# Patient Record
Sex: FEMALE | Race: WHITE | NOT HISPANIC OR LATINO | URBAN - NONMETROPOLITAN AREA
[De-identification: names, ages, dates, MRNs, and addresses within clinical notes are randomized per-mention and may not be internally consistent; named-entity substitution may affect disease eponyms.]

---

## 2018-10-03 ENCOUNTER — LAB (OUTPATIENT)
Dept: LAB | Facility: HOSPITAL | Age: 53
End: 2018-10-03
Attending: PEDIATRICS

## 2018-10-03 ENCOUNTER — TRANSCRIBE ORDERS (OUTPATIENT)
Dept: ADMINISTRATIVE | Facility: HOSPITAL | Age: 53
End: 2018-10-03

## 2018-10-03 DIAGNOSIS — R30.0 DYSURIA: Primary | ICD-10-CM

## 2018-10-03 DIAGNOSIS — R30.0 DYSURIA: ICD-10-CM

## 2018-11-24 ENCOUNTER — OFFICE VISIT (OUTPATIENT)
Dept: RETAIL CLINIC | Facility: CLINIC | Age: 53
End: 2018-11-24

## 2018-11-24 VITALS
OXYGEN SATURATION: 98 % | DIASTOLIC BLOOD PRESSURE: 60 MMHG | WEIGHT: 127.8 LBS | HEART RATE: 83 BPM | RESPIRATION RATE: 18 BRPM | BODY MASS INDEX: 20.06 KG/M2 | SYSTOLIC BLOOD PRESSURE: 90 MMHG | TEMPERATURE: 98 F | HEIGHT: 67 IN

## 2018-11-24 DIAGNOSIS — J06.9 ACUTE URI: Primary | ICD-10-CM

## 2018-11-24 PROCEDURE — 99213 OFFICE O/P EST LOW 20 MIN: CPT | Performed by: NURSE PRACTITIONER

## 2018-11-24 RX ORDER — AZITHROMYCIN 250 MG/1
TABLET, FILM COATED ORAL
Qty: 6 TABLET | Refills: 0 | Status: SHIPPED | OUTPATIENT
Start: 2018-11-24 | End: 2022-10-07

## 2018-11-24 RX ORDER — GUAIFENESIN 600 MG/1
1200 TABLET, EXTENDED RELEASE ORAL 2 TIMES DAILY
Qty: 30 TABLET | Refills: 0 | Status: SHIPPED | OUTPATIENT
Start: 2018-11-24 | End: 2022-10-07

## 2018-11-24 RX ORDER — CETIRIZINE HYDROCHLORIDE 10 MG/1
10 TABLET ORAL DAILY
COMMUNITY

## 2018-11-24 NOTE — PATIENT INSTRUCTIONS
"Upper Respiratory Infection, Adult  Most upper respiratory infections (URIs) are a viral infection of the air passages leading to the lungs. A URI affects the nose, throat, and upper air passages. The most common type of URI is nasopharyngitis and is typically referred to as \"the common cold.\"  URIs run their course and usually go away on their own. Most of the time, a URI does not require medical attention, but sometimes a bacterial infection in the upper airways can follow a viral infection. This is called a secondary infection. Sinus and middle ear infections are common types of secondary upper respiratory infections.  Bacterial pneumonia can also complicate a URI. A URI can worsen asthma and chronic obstructive pulmonary disease (COPD). Sometimes, these complications can require emergency medical care and may be life threatening.  What are the causes?  Almost all URIs are caused by viruses. A virus is a type of germ and can spread from one person to another.  What increases the risk?  You may be at risk for a URI if:  · You smoke.  · You have chronic heart or lung disease.  · You have a weakened defense (immune) system.  · You are very young or very old.  · You have nasal allergies or asthma.  · You work in crowded or poorly ventilated areas.  · You work in health care facilities or schools.    What are the signs or symptoms?  Symptoms typically develop 2-3 days after you come in contact with a cold virus. Most viral URIs last 7-10 days. However, viral URIs from the influenza virus (flu virus) can last 14-18 days and are typically more severe. Symptoms may include:  · Runny or stuffy (congested) nose.  · Sneezing.  · Cough.  · Sore throat.  · Headache.  · Fatigue.  · Fever.  · Loss of appetite.  · Pain in your forehead, behind your eyes, and over your cheekbones (sinus pain).  · Muscle aches.    How is this diagnosed?  Your health care provider may diagnose a URI by:  · Physical exam.  · Tests to check that your " symptoms are not due to another condition such as:  ? Strep throat.  ? Sinusitis.  ? Pneumonia.  ? Asthma.    How is this treated?  A URI goes away on its own with time. It cannot be cured with medicines, but medicines may be prescribed or recommended to relieve symptoms. Medicines may help:  · Reduce your fever.  · Reduce your cough.  · Relieve nasal congestion.    Follow these instructions at home:  · Take medicines only as directed by your health care provider.  · Gargle warm saltwater or take cough drops to comfort your throat as directed by your health care provider.  · Use a warm mist humidifier or inhale steam from a shower to increase air moisture. This may make it easier to breathe.  · Drink enough fluid to keep your urine clear or pale yellow.  · Eat soups and other clear broths and maintain good nutrition.  · Rest as needed.  · Return to work when your temperature has returned to normal or as your health care provider advises. You may need to stay home longer to avoid infecting others. You can also use a face mask and careful hand washing to prevent spread of the virus.  · Increase the usage of your inhaler if you have asthma.  · Do not use any tobacco products, including cigarettes, chewing tobacco, or electronic cigarettes. If you need help quitting, ask your health care provider.  How is this prevented?  The best way to protect yourself from getting a cold is to practice good hygiene.  · Avoid oral or hand contact with people with cold symptoms.  · Wash your hands often if contact occurs.    There is no clear evidence that vitamin C, vitamin E, echinacea, or exercise reduces the chance of developing a cold. However, it is always recommended to get plenty of rest, exercise, and practice good nutrition.  Contact a health care provider if:  · You are getting worse rather than better.  · Your symptoms are not controlled by medicine.  · You have chills.  · You have worsening shortness of breath.  · You have  brown or red mucus.  · You have yellow or brown nasal discharge.  · You have pain in your face, especially when you bend forward.  · You have a fever.  · You have swollen neck glands.  · You have pain while swallowing.  · You have white areas in the back of your throat.  Get help right away if:  · You have severe or persistent:  ? Headache.  ? Ear pain.  ? Sinus pain.  ? Chest pain.  · You have chronic lung disease and any of the following:  ? Wheezing.  ? Prolonged cough.  ? Coughing up blood.  ? A change in your usual mucus.  · You have a stiff neck.  · You have changes in your:  ? Vision.  ? Hearing.  ? Thinking.  ? Mood.  This information is not intended to replace advice given to you by your health care provider. Make sure you discuss any questions you have with your health care provider.  Document Released: 06/13/2002 Document Revised: 08/20/2017 Document Reviewed: 03/25/2015  [employer] Interactive Patient Education © 2018 [employer]    Due to the duration of your symptoms and the fact they are worsening an antibiotic has been prescribed for you that needs to be taken as directed for the full length of treatment. It is recommended that you take a probiotic when taking an antibiotic. Probiotics are found over the counter in pill form and in some brands of yogurt.      Increase fluid intake. Take Mucinex as prescribed to thin drainage.     If symptoms persist or worsen please see your primary care provider or return for recheck.

## 2018-11-25 NOTE — PROGRESS NOTES
"  Chief Complaint   Patient presents with   • Sore Throat   • Earache     Subjective   Michelle Schulz is a 53 y.o. female who presents to the clinic today with complaints of sore throat, earache, post nasal drainage, cough.   URI    This is a new problem. Episode onset: \"over a week ago\" The problem has been gradually worsening. Maximum temperature: not sure, hasn't been able to check. Associated symptoms include congestion, coughing (mostly dry), ear pain (left ear \"just feels weird\"), headaches (frontal ), sneezing and a sore throat (worse the last couple of days). Pertinent negatives include no abdominal pain, chest pain, diarrhea, nausea, rhinorrhea, vomiting or wheezing. Treatments tried: Excedrin migraine, Zyrtec, Nyquil. The treatment provided mild relief.     Current Outpatient Medications:   •  cetirizine (zyrTEC) 10 MG tablet, Take 10 mg by mouth Daily., Disp: , Rfl:   •  DM-Phenylephrine-Acetaminophen 10-5-325 MG capsule, Take  by mouth., Disp: , Rfl:     Allergies:  Penicillins and Tetracyclines & related    Past Medical History:   Diagnosis Date   • Allergic    • Migraines      Past Surgical History:   Procedure Laterality Date   •  SECTION       Family History   Problem Relation Age of Onset   • Cancer Mother    • Cancer Father      Social History     Tobacco Use   • Smoking status: Never Smoker   Substance Use Topics   • Alcohol use: No     Frequency: Never   • Drug use: Defer       Review of Systems  Review of Systems   Constitutional: Negative for chills, fatigue and fever.   HENT: Positive for congestion, ear pain (left ear \"just feels weird\"), sneezing and sore throat (worse the last couple of days). Negative for rhinorrhea.    Respiratory: Positive for cough (mostly dry). Negative for shortness of breath and wheezing.    Cardiovascular: Negative for chest pain.   Gastrointestinal: Negative for abdominal pain, diarrhea, nausea and vomiting.   Neurological: Positive for headaches (frontal ). " "      Objective   BP 90/60   Pulse 83   Temp 98 °F (36.7 °C) (Oral)   Resp 18   Ht 170.2 cm (67\")   Wt 58 kg (127 lb 12.8 oz)   SpO2 98%   BMI 20.02 kg/m²       Physical Exam   Constitutional: She is oriented to person, place, and time. She appears well-developed and well-nourished. She is cooperative. No distress.   HENT:   Head: Normocephalic and atraumatic.   Right Ear: Tympanic membrane, external ear and ear canal normal.   Left Ear: Tympanic membrane, external ear and ear canal normal.   Nose: Nose normal. No sinus tenderness.   Mouth/Throat: Uvula is midline and mucous membranes are normal. Posterior oropharyngeal erythema (mild erythema, post nasal drainage) present. Tonsils are 1+ on the right. Tonsils are 1+ on the left. No tonsillar exudate.   Eyes: Conjunctivae, EOM and lids are normal. Pupils are equal, round, and reactive to light.   Neck: Trachea normal and normal range of motion. Neck supple.   Cardiovascular: Normal rate, regular rhythm, S1 normal, S2 normal and normal heart sounds.   Pulmonary/Chest: Effort normal and breath sounds normal. No respiratory distress. She has no wheezes. She has no rhonchi. She has no rales.   Lymphadenopathy:     She has no cervical adenopathy.   Neurological: She is alert and oriented to person, place, and time. Coordination and gait normal.   Skin: Skin is warm, dry and intact.   Psychiatric: She has a normal mood and affect. Her speech is normal and behavior is normal.   Vitals reviewed.      Assessment/Plan     Michelle was seen today for sore throat and earache.    Diagnoses and all orders for this visit:    Acute URI    Other orders  -     azithromycin (ZITHROMAX Z-ZANE) 250 MG tablet; Take 2 tablets the first day, then 1 tablet daily for 4 days.  -     guaiFENesin (MUCINEX) 600 MG 12 hr tablet; Take 2 tablets by mouth 2 (Two) Times a Day.      Due to the duration of your symptoms and the fact they are worsening an antibiotic has been prescribed for you that " needs to be taken as directed for the full length of treatment. It is recommended that you take a probiotic when taking an antibiotic. Probiotics are found over the counter in pill form and in some brands of yogurt.      Increase fluid intake. Take Mucinex as prescribed to thin drainage.     If symptoms persist or worsen please see your primary care provider or return for recheck.

## 2020-09-26 ENCOUNTER — NURSE TRIAGE (OUTPATIENT)
Dept: CALL CENTER | Facility: HOSPITAL | Age: 55
End: 2020-09-26

## 2020-09-26 PROCEDURE — 87635 SARS-COV-2 COVID-19 AMP PRB: CPT | Performed by: NURSE PRACTITIONER

## 2020-09-27 NOTE — TELEPHONE ENCOUNTER
"Seen at the [facility] today was tested for covid, did a rapid test and she never heard, pt coughing almost non stop. Stated they told her lungs had sounded fine, they thought it was all drainage. Highest temp has been 100.has flonase and tessalon for cough    Reason for Disposition  • [1] Continuous (nonstop) coughing interferes with work or school AND [2] no improvement using cough treatment per Care Advice    Additional Information  • Negative: Severe difficulty breathing (e.g., struggling for each breath, speaks in single words)  • Negative: Bluish (or gray) lips or face now  • Negative: [1] Difficulty breathing AND [2] exposure to flames, smoke, or fumes  • Negative: [1] Stridor AND [2] difficulty breathing  • Negative: Sounds like a life-threatening emergency to the triager  • Negative: [1] Previous asthma attacks AND [2] this feels like asthma attack  • Negative: Dry (non-productive) cough (i.e., no sputum or minimal clear sputum)  • Negative: Chest pain  (Exception: MILD central chest pain, present only when coughing)  • Negative: Difficulty breathing  • Negative: Patient sounds very sick or weak to the triager  • Negative: [1] Coughed up blood AND [2] > 1 tablespoon (15 ml) (Exception: blood-tinged sputum)  • Negative: Fever > 103 F (39.4 C)  • Negative: [1] Fever > 101 F (38.3 C) AND [2] age > 60  • Negative: [1] Fever > 100.0 F (37.8 C) AND [2] bedridden (e.g., nursing home patient, CVA, chronic illness, recovering from surgery)  • Negative: [1] Fever > 100.0 F (37.8 C) AND [2] diabetes mellitus or weak immune system (e.g., HIV positive, cancer chemo, splenectomy, organ transplant, chronic steroids)  • Negative: Wheezing is present  • Negative: SEVERE coughing spells (e.g., whooping sound after coughing, vomiting after coughing)  • Negative: Coughing up anne marie-colored (reddish-brown) sputum  • Negative: Fever present > 3 days (72 hours)    Answer Assessment - Initial Assessment Questions  1. ONSET: \"When did the " "cough begin?\"        acouple days ago  2. SEVERITY: \"How bad is the cough today?\"      worse  3. RESPIRATORY DISTRESS: \"Describe your breathing.\"       congested  4. FEVER: \"Do you have a fever?\" If so, ask: \"What is your temperature, how was it measured, and when did it start?\"      100  5. SPUTUM: \"Describe the color of your sputum\" (clear, white, yellow, green)      n/a  6. HEMOPTYSIS: \"Are you coughing up any blood?\" If so ask: \"How much?\" (flecks, streaks, tablespoons, etc.)      no  7. CARDIAC HISTORY: \"Do you have any history of heart disease?\" (e.g., heart attack, congestive heart failure)       no  8. LUNG HISTORY: \"Do you have any history of lung disease?\"  (e.g., pulmonary embolus, asthma, emphysema)      n/a  9. PE RISK FACTORS: \"Do you have a history of blood clots?\" (or: recent major surgery, recent prolonged travel, bedridden)      unknown  10. OTHER SYMPTOMS: \"Do you have any other symptoms?\" (e.g., runny nose, wheezing, chest pain)        Very runny nose  11. PREGNANCY: \"Is there any chance you are pregnant?\" \"When was your last menstrual period?\"        no  12. TRAVEL: \"Have you traveled out of the country in the last month?\" (e.g., travel history, exposures)        no    Protocols used: COUGH - ACUTE PRODUCTIVE-ADULT-AH      "

## 2022-10-07 ENCOUNTER — HOSPITAL ENCOUNTER (OUTPATIENT)
Dept: GENERAL RADIOLOGY | Facility: HOSPITAL | Age: 57
Discharge: HOME | End: 2022-10-07

## 2022-10-07 PROCEDURE — 73610 X-RAY EXAM OF ANKLE: CPT

## 2022-10-07 PROCEDURE — 73562 X-RAY EXAM OF KNEE 3: CPT

## 2024-06-20 ENCOUNTER — TRANSCRIBE ORDERS (OUTPATIENT)
Dept: PHYSICAL THERAPY | Facility: CLINIC | Age: 59
End: 2024-06-20
Payer: COMMERCIAL

## 2024-06-20 DIAGNOSIS — M54.50 LOW BACK PAIN, UNSPECIFIED BACK PAIN LATERALITY, UNSPECIFIED CHRONICITY, UNSPECIFIED WHETHER SCIATICA PRESENT: Primary | ICD-10-CM

## 2024-07-29 NOTE — PROGRESS NOTES
[facility] - PODIATRY    Today's Date: 08/06/2024     Patient Name: Michelle Schulz  MRN: [id number]  CSN: [id number]  PCP: Leonardo Boston Jr., MD  Referring Provider: Leonardo Boston Jr., *    SUBJECTIVE     Chief Complaint   Patient presents with    Follow-up     Leonardo Boston Jr., MD 07/09/024 PARESTHESIA BOTTOM RIGHT FOOT- TOES AND PAD- NO IMAGING- NO PRIOR SX- pt states right foot is numb and tingly all the time and feels like sock is bunched up under pad. Left foot occ. Has known back issues but thinks that is the problem but her dr says that is not the case. Also has poss nail fungus - pt pain 3/10 at worst      HPI: Michelle Schulz, a 58 y.o.female, comes to clinic as a(n) new patient complaining of foot pain and complaining of toenail/callus issues. Patient has h/o migraines, GERD, hiatal hernia . Patient is non-diabetic.  Pain to her right foot that started about 3 months ago.  Patient also relays a numb and tingling-like feeling to the ball of her right foot.  Relates that she began to have issues after she injured her back laying rocks down in her yard back in May.  Notes she did go to her PCP who then referred her here to podiatry.  Patient attributes the pain to her right foot to the injury to her back.  Notes she did have an x-ray obtained that did not show any issues with the bone structure however she feels as if she may be having sciatic like pain on her right side.  Also relays that her toenails are thickened, discolored, and crumbly.  Notes they have been like this for 20 something years now, however is curious about the different treatment options for this condition moving forward.  Admits pain at 3/10 level and described as pressure and sharp. Denies previous treatment. Denies any constitutional symptoms. No other pedal complaints at this time. Been going to the chiropractor- hurt her back laying rocks back in May.     Past Medical History:   Diagnosis Date    Allergic      Hiatal hernia     Migraines      Past Surgical History:   Procedure Laterality Date     SECTION      ESOPHAGEAL DILATATION       Family History   Problem Relation Age of Onset    Cancer Mother     Cancer Father      Social History     Socioeconomic History    Marital status:    Tobacco Use    Smoking status: Never    Smokeless tobacco: Never   Vaping Use    Vaping status: Never Used   Substance and Sexual Activity    Alcohol use: No    Drug use: Never    Sexual activity: Yes     Partners: Male     Allergies   Allergen Reactions    Penicillins Hives    Shellfish-Derived Products Swelling    Tetracyclines & Related Hives    Sumatriptan Nausea And Vomiting     Current Outpatient Medications   Medication Sig Dispense Refill    acetaminophen (TYLENOL) 650 MG 8 hr tablet Take 1 tablet by mouth As Needed.      ELDERBERRY PO Take  by mouth.      ubrogepant 100 MG tablet PLEASE SEE ATTACHED FOR DETAILED DIRECTIONS      albuterol sulfate  (90 Base) MCG/ACT inhaler Inhale 2 puffs Every 4 (Four) Hours As Needed for Wheezing or Shortness of Air (Rinse mouth after use). 6.7 g 0    cetirizine (zyrTEC) 10 MG tablet Take 1 tablet by mouth Daily.      ketorolac (TORADOL) 10 MG tablet Take 1 tablet by mouth Every 6 (Six) Hours As Needed for Moderate Pain. 20 tablet 0    methylPREDNISolone (MEDROL) 4 MG dose pack Take as directed 21 tablet 0     No current facility-administered medications for this visit.     Review of Systems   Constitutional:  Negative for chills and fever.   HENT:  Negative for congestion.    Respiratory:  Negative for shortness of breath.    Cardiovascular:  Negative for chest pain and leg swelling.   Gastrointestinal:  Negative for constipation, diarrhea, nausea and vomiting.   Musculoskeletal:  Positive for arthralgias and back pain.        Right foot pain; lower back pain   Skin:  Negative for wound.        Thickened, irregular, discolored toenails   Neurological:  Negative for numbness.    Hematological:  Does not bruise/bleed easily.   Psychiatric/Behavioral:  Negative for agitation, behavioral problems and confusion.        OBJECTIVE     Vitals:    08/06/24 0845   BP: 124/78   Pulse: 61   SpO2: 98%       PHYSICAL EXAM  GEN:   Accompanied by granddaughter.     Foot/Ankle Exam    GENERAL  Appearance:  appears stated age  Orientation:  AAOx3  Affect:  appropriate  Gait:  unimpaired  Assistance:  independent  Right shoe gear: casual shoe  Left shoe gear: casual shoe    VASCULAR     Right Foot Vascularity   Dorsalis pedis:  2+  Posterior tibial:  2+  Skin temperature:  warm  Edema grading:  None  CFT:  3  Pedal hair growth:  Absent  Varicosities:  none     Left Foot Vascularity   Dorsalis pedis:  2+  Posterior tibial:  2+  Skin temperature:  warm  Edema grading:  None  CFT:  3  Pedal hair growth:  Absent  Varicosities:  none     NEUROLOGIC     Right Foot Neurologic   Light touch sensation: diminished  Vibratory sensation: diminished  Hot/Cold sensation: diminished  Protective Sensation using [place]icky Monofilament:   Sites intact: 7  Sites tested: 10     Left Foot Neurologic   Normal sensation    Light touch sensation: normal  Vibratory sensation: normal  Hot/Cold sensation:  normal  Protective Sensation using [place] Monofilament:   Sites intact: 10  Sites tested: 10    MUSCULOSKELETAL     Right Foot Musculoskeletal   Ecchymosis:  none  Tenderness:  neuroma tenderness   (Noted to second IS)  Arch:  Normal     Left Foot Musculoskeletal   Ecchymosis:  none  Tenderness:  none  Arch:  Normal    MUSCLE STRENGTH     Right Foot Muscle Strength   Foot dorsiflexion:  5  Foot plantar flexion:  5  Foot inversion:  5  Foot eversion:  5     Left Foot Muscle Strength   Foot dorsiflexion:  5  Foot plantar flexion:  5  Foot inversion:  5  Foot eversion:  5    RANGE OF MOTION     Right Foot Range of Motion   Foot and ankle ROM within normal limits       Left Foot Range of Motion   Foot and ankle ROM  within normal limits      DERMATOLOGIC      Right Foot Dermatologic   Skin  Right foot skin is intact.   Nails  1.  Positive for onychomycosis, abnormal thickness, subungual debris and dystrophic nail.  2.  Positive for onychomycosis, abnormal thickness, subungual debris and dystrophic nail.  3.  Positive for onychomycosis, abnormal thickness, subungual debris and dystrophic nail.  4.  Positive for onychomycosis, abnormal thickness, subungual debris and dystrophic nail.  5.  Positive for onychomycosis, abnormal thickness, subungual debris and dystrophic nail.     Left Foot Dermatologic   Skin  Left foot skin is intact.   Nails  1.  Positive for onychomycosis, abnormal thickness, subungual debris and dystrophic nail.  2.  Positive for onychomycosis, abnormal thickness, subungual debris and dystrophic nail.  3.  Positive for onychomycosis, abnormal thickness, subungual debris and dystrophic nail.  4.  Positive for onychomycosis, abnormally thick, subungual debris and dystrophic nail.  5.  Positive for onychomycosis, abnormally thick, subungual debris and dystrophic nail.    Image:       RADIOLOGY/NUCLEAR:  No results found.    LABORATORY/CULTURE RESULTS:      PATHOLOGY RESULTS:       ASSESSMENT/PLAN     Diagnoses and all orders for this visit:    1. Right foot pain (Primary)    2. Plantar neuroma of right foot    3. Onychomycosis    4. Neuropathy    5. Acute right-sided low back pain, unspecified whether sciatica present  -     MRI Lumbar Spine Without Contrast; Future  -     Cancel: Ambulatory Referral to Neurosurgery  -     Ambulatory Referral to Neurosurgery    Other orders  -     methylPREDNISolone (MEDROL) 4 MG dose pack; Take as directed  Dispense: 21 tablet; Refill: 0      Comprehensive lower extremity examination and evaluation was performed.  Discussed findings and treatment plan including risks, benefits, and treatment options with patient in detail. Patient agreed with treatment plan.  Patient may maintain  nails and calluses at home utilizing emery board or pumice stone between visits as needed  Findings consistent with plantar neuroma to second IS of right foot.  Discussed several different treatment options for this condition patient including conservative measures with oral and/or topical NSAIDs, wearing supportive thick cushioned shoes, icing,resting, and use of metatarsal pad to displace pressure.  So discussed possibility of trying a steroid injection into site of pain.  Patient prefers to remain conservative at this point in time.   Rx placed for steroid pack today for pain and inflammation to right foot.  Metatarsal pad dispensed at today's visit.  Patient continues to relay that she believes her numbness is coming from her lower back- placed order for MRI since xray has already been performed. Discussed with patient insurance may not cover this imaging and that scheduling would be in contact with her if that was the case.   Referral to Neurosurgery placed today for further evaluation of previous back injury.   Patient to follow-up in 6 weeks for recheck.    An After Visit Summary was printed and given to the patient at discharge, including (if requested) any available informative/educational handouts regarding diagnosis, treatment, or medications. All questions were answered to patient/family satisfaction. Should symptoms fail to improve or worsen they agree to call or return to clinic or to go to the Emergency Department. Discussed the importance of following up with any needed screening tests/labs/specialist appointments and any requested follow-up recommended by me today. Importance of maintaining follow-up discussed and patient accepts that missed appointments can delay diagnosis and potentially lead to worsening of conditions.  Return in about 6 weeks (around 9/17/2024) for Follow-up with APRN, Follow-up in [facility] or sooner if acute issues arise.      This document has been electronically  signed by Alina Mahoney, SEAN on August 6, 2024 13:56 CDT

## 2024-08-05 ENCOUNTER — TELEPHONE (OUTPATIENT)
Age: 59
End: 2024-08-05
Payer: COMMERCIAL

## 2024-08-05 NOTE — TELEPHONE ENCOUNTER
Hub to relay   Spoke with patient regarding appt on 08/06/2024. Patient confirmed date and time off appt.

## 2024-08-06 ENCOUNTER — OFFICE VISIT (OUTPATIENT)
Age: 59
End: 2024-08-06
Payer: COMMERCIAL

## 2024-08-06 ENCOUNTER — PATIENT ROUNDING (BHMG ONLY) (OUTPATIENT)
Age: 59
End: 2024-08-06

## 2024-08-06 VITALS
BODY MASS INDEX: 22.76 KG/M2 | HEIGHT: 67 IN | SYSTOLIC BLOOD PRESSURE: 124 MMHG | OXYGEN SATURATION: 98 % | DIASTOLIC BLOOD PRESSURE: 78 MMHG | HEART RATE: 61 BPM | WEIGHT: 145 LBS

## 2024-08-06 DIAGNOSIS — G62.9 NEUROPATHY: ICD-10-CM

## 2024-08-06 DIAGNOSIS — G57.61 PLANTAR NEUROMA OF RIGHT FOOT: ICD-10-CM

## 2024-08-06 DIAGNOSIS — M54.50 ACUTE RIGHT-SIDED LOW BACK PAIN, UNSPECIFIED WHETHER SCIATICA PRESENT: ICD-10-CM

## 2024-08-06 DIAGNOSIS — M79.671 RIGHT FOOT PAIN: Primary | ICD-10-CM

## 2024-08-06 DIAGNOSIS — B35.1 ONYCHOMYCOSIS: ICD-10-CM

## 2024-08-06 PROBLEM — K21.9 GERD (GASTROESOPHAGEAL REFLUX DISEASE): Chronic | Status: ACTIVE | Noted: 2024-08-06

## 2024-08-06 PROCEDURE — 99214 OFFICE O/P EST MOD 30 MIN: CPT

## 2024-08-06 RX ORDER — METHYLPREDNISOLONE 4 MG/1
TABLET ORAL
Qty: 21 TABLET | Refills: 0 | Status: SHIPPED | OUTPATIENT
Start: 2024-08-06

## 2024-08-06 NOTE — PROGRESS NOTES
August 6, 2024    Hello, may I speak with Michelle Schulz?    My name is JUNE      I am  with [facility]  [address]  [facility] [address] [phone].    Before we get started may I verify your date of birth? 1965    I am calling to officially welcome you to our practice and ask about your recent visit. Is this a good time to talk? yes    Tell me about your visit with us. What things went well?  VISIT WENT WELL       We're always looking for ways to make our patients' experiences even better. Do you have recommendations on ways we may improve?  no    Overall were you satisfied with your first visit to our practice? yes       I appreciate you taking the time to speak with me today. Is there anything else I can do for you? no      Thank you, and have a great day.

## 2024-09-25 ENCOUNTER — TELEPHONE (OUTPATIENT)
Dept: OBSTETRICS AND GYNECOLOGY | Age: 59
End: 2024-09-25
Payer: COMMERCIAL

## 2024-09-26 ENCOUNTER — OFFICE VISIT (OUTPATIENT)
Dept: OBSTETRICS AND GYNECOLOGY | Age: 59
End: 2024-09-26
Payer: COMMERCIAL

## 2024-09-26 VITALS
WEIGHT: 146 LBS | BODY MASS INDEX: 22.91 KG/M2 | SYSTOLIC BLOOD PRESSURE: 108 MMHG | DIASTOLIC BLOOD PRESSURE: 76 MMHG | HEIGHT: 67 IN

## 2024-09-26 DIAGNOSIS — Z01.419 WELL WOMAN EXAM WITH ROUTINE GYNECOLOGICAL EXAM: Primary | ICD-10-CM

## 2024-09-26 DIAGNOSIS — Z12.31 ENCOUNTER FOR SCREENING MAMMOGRAM FOR BREAST CANCER: ICD-10-CM

## 2024-09-26 DIAGNOSIS — Z12.4 ENCOUNTER FOR SCREENING FOR CERVICAL CANCER: ICD-10-CM

## 2024-09-26 DIAGNOSIS — N89.8 VAGINAL DRYNESS: ICD-10-CM

## 2024-09-26 PROCEDURE — G0123 SCREEN CERV/VAG THIN LAYER: HCPCS | Performed by: NURSE PRACTITIONER

## 2024-09-26 PROCEDURE — 87624 HPV HI-RISK TYP POOLED RSLT: CPT | Performed by: NURSE PRACTITIONER

## 2024-09-26 RX ORDER — ESTRADIOL 0.1 MG/G
CREAM VAGINAL
Qty: 42.5 G | Refills: 0 | Status: SHIPPED | OUTPATIENT
Start: 2024-09-26

## 2024-09-27 LAB
GEN CATEG CVX/VAG CYTO-IMP: NORMAL
HPV I/H RISK 4 DNA CVX QL PROBE+SIG AMP: NOT DETECTED
LAB AP CASE REPORT: NORMAL
LAB AP GYN ADDITIONAL INFORMATION: NORMAL
LAB AP GYN OTHER FINDINGS: NORMAL
Lab: NORMAL
PATH INTERP SPEC-IMP: NORMAL
STAT OF ADQ CVX/VAG CYTO-IMP: NORMAL

## 2025-02-04 ENCOUNTER — TRANSCRIBE ORDERS (OUTPATIENT)
Dept: ADMINISTRATIVE | Facility: HOSPITAL | Age: 60
End: 2025-02-04
Payer: COMMERCIAL

## 2025-02-04 DIAGNOSIS — Z12.31 ENCOUNTER FOR SCREENING MAMMOGRAM FOR MALIGNANT NEOPLASM OF BREAST: Primary | ICD-10-CM

## 2025-02-09 LAB
NCCN CRITERIA FLAG: NORMAL
TYRER CUZICK SCORE: 18.5

## 2025-03-19 ENCOUNTER — HOSPITAL ENCOUNTER (OUTPATIENT)
Dept: MAMMOGRAPHY | Facility: HOSPITAL | Age: 60
Discharge: HOME | End: 2025-03-19
Admitting: NURSE PRACTITIONER
Payer: COMMERCIAL

## 2025-03-19 DIAGNOSIS — Z12.31 ENCOUNTER FOR SCREENING MAMMOGRAM FOR BREAST CANCER: ICD-10-CM

## 2025-03-19 PROCEDURE — 77063 BREAST TOMOSYNTHESIS BI: CPT

## 2025-03-19 PROCEDURE — 77067 SCR MAMMO BI INCL CAD: CPT

## 2025-03-20 ENCOUNTER — TELEPHONE (OUTPATIENT)
Dept: OBSTETRICS AND GYNECOLOGY | Age: 60
End: 2025-03-20
Payer: COMMERCIAL

## 2025-03-20 NOTE — TELEPHONE ENCOUNTER
Pt called to ask about message to cancel appointment. Pt states that provider told her she did not need to come anymore for visits and that pt would like to just cancel the appointment in October. Pt informed that this appointment was her well women's exam. Pt reports she is postmenopausal and does not need to be seen. Voices understanding.

## 2025-06-09 ENCOUNTER — TELEPHONE (OUTPATIENT)
Dept: SURGERY | Facility: CLINIC | Age: 60
End: 2025-06-09
Payer: COMMERCIAL

## 2025-06-09 NOTE — TELEPHONE ENCOUNTER
Attempted to contact pt regarding her cancelled appointment with us. I left pt a detailed voicemail with our office phone number to call back to get rescheduled with us.     ECC  6/9/2025

## 2025-06-30 ENCOUNTER — OFFICE VISIT (OUTPATIENT)
Dept: FAMILY MEDICINE CLINIC | Facility: CLINIC | Age: 60
End: 2025-06-30
Payer: COMMERCIAL

## 2025-06-30 VITALS
OXYGEN SATURATION: 96 % | HEART RATE: 95 BPM | TEMPERATURE: 98.8 F | WEIGHT: 148.8 LBS | SYSTOLIC BLOOD PRESSURE: 120 MMHG | BODY MASS INDEX: 23.35 KG/M2 | DIASTOLIC BLOOD PRESSURE: 88 MMHG | HEIGHT: 67 IN

## 2025-06-30 DIAGNOSIS — R06.2 WHEEZING: ICD-10-CM

## 2025-06-30 DIAGNOSIS — K44.9 HIATAL HERNIA: ICD-10-CM

## 2025-06-30 DIAGNOSIS — R09.81 NASAL CONGESTION: ICD-10-CM

## 2025-06-30 DIAGNOSIS — J04.0 LARYNGITIS: Primary | ICD-10-CM

## 2025-06-30 PROCEDURE — 99213 OFFICE O/P EST LOW 20 MIN: CPT | Performed by: FAMILY MEDICINE

## 2025-06-30 RX ORDER — ALBUTEROL SULFATE 90 UG/1
2 INHALANT RESPIRATORY (INHALATION) EVERY 4 HOURS PRN
Qty: 18 G | Refills: 11 | Status: SHIPPED | OUTPATIENT
Start: 2025-06-30

## 2025-06-30 RX ORDER — CLINDAMYCIN HYDROCHLORIDE 300 MG/1
1 CAPSULE ORAL 3 TIMES DAILY
COMMUNITY
Start: 2025-06-17

## 2025-06-30 RX ORDER — FLUTICASONE PROPIONATE 50 MCG
2 SPRAY, SUSPENSION (ML) NASAL DAILY
COMMUNITY
Start: 2025-06-17

## 2025-06-30 RX ORDER — PREDNISONE 20 MG/1
TABLET ORAL
Qty: 12 TABLET | Refills: 0 | Status: SHIPPED | OUTPATIENT
Start: 2025-06-30

## 2025-06-30 RX ORDER — OMEPRAZOLE 40 MG/1
40 CAPSULE, DELAYED RELEASE ORAL DAILY
Qty: 30 CAPSULE | Refills: 1 | Status: SHIPPED | OUTPATIENT
Start: 2025-06-30

## 2025-06-30 RX ORDER — HYDROXYZINE HYDROCHLORIDE 10 MG/5ML
4 SYRUP ORAL EVERY 6 HOURS PRN
COMMUNITY

## 2025-06-30 NOTE — PROGRESS NOTES
CC:   Chief Complaint   Patient presents with    Establish Care     Patient presents to clinic to establish care    Hoarse    Ear Fullness    Dizziness       History:  Michelle Schulz is a 59 y.o. female who presents today for follow-up for evaluation of the above:    History of Present Illness  Felt sick since 6/10/25  Started with runny nose, cough, congestion  Seen by [place] [facility] that week and given IM steroid and abx, had bilateral ear infection  Had follow up and was given steroid pills, cough pills, abx, nose spray that has improved but she still feels like her ears are still underwater, cough with wheezing, hoarse voice, sometimes the room spins  Takes over-the-counter omeprazole for hiatal hernia      Ms. Schulz  reports that she has never smoked. She has never used smokeless tobacco. She reports that she does not drink alcohol and does not use drugs.      Current Outpatient Medications:     acetaminophen (TYLENOL) 650 MG 8 hr tablet, Take 1 tablet by mouth As Needed., Disp: , Rfl:     chlorpheniramine (CHLOR-TRIMETON) 4 MG tablet, Take 1 tablet by mouth Every 6 (Six) Hours As Needed for Allergies., Disp: , Rfl:     clindamycin (CLEOCIN) 300 MG capsule, Take 1 capsule by mouth 3 times a day., Disp: , Rfl:     ELDERBERRY PO, Take  by mouth., Disp: , Rfl:     fluticasone (FLONASE) 50 MCG/ACT nasal spray, Administer 2 sprays into the nostril(s) as directed by provider Daily., Disp: , Rfl:     ubrogepant 100 MG tablet, PLEASE SEE ATTACHED FOR DETAILED DIRECTIONS (Patient taking differently: 1 tablet As Needed.), Disp: , Rfl:     albuterol sulfate  (90 Base) MCG/ACT inhaler, Inhale 2 puffs Every 4 (Four) Hours As Needed for Wheezing or Shortness of Air., Disp: 18 g, Rfl: 11    estradiol (ESTRACE) 0.1 MG/GM vaginal cream, Use 1 gram vaginally nightly x 2 weeks then 2-3 times weekly (Patient not taking: Reported on 6/30/2025), Disp: 42.5 g, Rfl: 0    omeprazole (priLOSEC) 40 MG capsule, Take 1 capsule by mouth  "Daily., Disp: 30 capsule, Rfl: 1    predniSONE (DELTASONE) 20 MG tablet, 20 mg daily x 7 days, then 10 mg till finished, Disp: 12 tablet, Rfl: 0      OBJECTIVE:  /88 (BP Location: Right arm, Patient Position: Sitting, Cuff Size: Adult)   Pulse 95   Temp 98.8 °F (37.1 °C) (Temporal)   Ht 170.2 cm (67\")   Wt 67.5 kg (148 lb 12.8 oz)   SpO2 96%   BMI 23.31 kg/m²    Physical Exam  Vitals and nursing note reviewed.   Constitutional:       General: She is not in acute distress.     Appearance: She is not diaphoretic.   HENT:      Head: Normocephalic and atraumatic.      Right Ear: Tympanic membrane normal.      Left Ear: Tympanic membrane normal.      Nose: Congestion present.   Eyes:      General: No scleral icterus.        Right eye: No discharge.         Left eye: No discharge.      Conjunctiva/sclera: Conjunctivae normal.   Neck:      Trachea: No tracheal deviation.   Cardiovascular:      Rate and Rhythm: Normal rate and regular rhythm.      Heart sounds: Normal heart sounds. No murmur heard.     No friction rub. No gallop.   Pulmonary:      Effort: Pulmonary effort is normal. No respiratory distress.      Breath sounds: Normal breath sounds. No wheezing or rales.   Skin:     General: Skin is warm and dry.      Coloration: Skin is not pale.   Neurological:      Mental Status: She is alert and oriented to person, place, and time.   Psychiatric:         Behavior: Behavior normal.         Thought Content: Thought content normal.         Judgment: Judgment normal.         Assessment/Plan     Diagnosis Plan   1. Laryngitis  predniSONE (DELTASONE) 20 MG tablet      2. Nasal congestion  predniSONE (DELTASONE) 20 MG tablet      3. Wheezing  predniSONE (DELTASONE) 20 MG tablet    albuterol sulfate  (90 Base) MCG/ACT inhaler      4. Hiatal hernia  omeprazole (priLOSEC) 40 MG capsule        Assessment & Plan  Suspect persistent laryngitis from recent viral infection with likely bronchospasm.  Some concern for " laryngeal reflux in the setting of hiatal hernia.  Doubt antibiotic need, will treat with steroid and increase PPI dosing above       An After Visit Summary was printed and given to the patient at discharge.  Return if symptoms worsen or fail to improve. Sooner if problems arise.         Tk Trejo D.O.  Family Medicine  Osteopathic Neuromusculoskeletal Medicine

## 2025-07-15 ENCOUNTER — OFFICE VISIT (OUTPATIENT)
Dept: OTOLARYNGOLOGY | Facility: CLINIC | Age: 60
End: 2025-07-15
Payer: COMMERCIAL

## 2025-07-15 ENCOUNTER — PROCEDURE VISIT (OUTPATIENT)
Dept: OTOLARYNGOLOGY | Facility: CLINIC | Age: 60
End: 2025-07-15
Payer: COMMERCIAL

## 2025-07-15 VITALS
HEART RATE: 80 BPM | RESPIRATION RATE: 19 BRPM | HEIGHT: 67 IN | DIASTOLIC BLOOD PRESSURE: 73 MMHG | WEIGHT: 149 LBS | SYSTOLIC BLOOD PRESSURE: 116 MMHG | BODY MASS INDEX: 23.39 KG/M2

## 2025-07-15 DIAGNOSIS — H69.93 DYSFUNCTION OF BOTH EUSTACHIAN TUBES: ICD-10-CM

## 2025-07-15 DIAGNOSIS — Z01.10 HEARING WITHIN NORMAL LIMITS IN BOTH EARS: ICD-10-CM

## 2025-07-15 DIAGNOSIS — J04.0 REFLUX LARYNGITIS: Primary | ICD-10-CM

## 2025-07-15 DIAGNOSIS — K21.9 REFLUX LARYNGITIS: Primary | ICD-10-CM

## 2025-07-15 DIAGNOSIS — Z01.10 HEARING WITHIN NORMAL LIMITS IN BOTH EARS: Primary | ICD-10-CM

## 2025-07-15 RX ORDER — MOMETASONE FUROATE MONOHYDRATE 50 UG/1
2 SPRAY, METERED NASAL 2 TIMES DAILY
Qty: 17 G | Refills: 5 | Status: SHIPPED | OUTPATIENT
Start: 2025-07-15

## 2025-07-15 RX ORDER — GUAIFENESIN 600 MG/1
600 TABLET, EXTENDED RELEASE ORAL EVERY 12 HOURS SCHEDULED
Qty: 60 TABLET | Refills: 3 | Status: SHIPPED | OUTPATIENT
Start: 2025-07-15

## 2025-07-15 RX ORDER — CETIRIZINE HYDROCHLORIDE 5 MG/1
5 TABLET ORAL DAILY
COMMUNITY

## 2025-07-15 NOTE — PROGRESS NOTES
SEAN Carbone  [facility]  [address], [address]  [facility] [address]  Fax [phone]  Phone [phone]      Visit Type: NEW PATIENT   Chief Complaint   Patient presents with    Otitis Media     Acute suppurative otitis media without spontaneous rupture of ear drum           HPI      History of Present Illness  The patient is a 59-year-old female, new patient, referred for otitis media. She had an audiological exam today and reports she feels like she has a current ear infection. Hearing was normal bilaterally with type A tympanograms bilaterally.    She experienced a sore throat and difficulty swallowing on 06/10/2025, which led her to take NyQuil and DayQuil. Despite this, her condition worsened, preventing her from going to work the next day. She sought medical attention at [facility] on 06/14/2025, where she received a steroid injection and an antibiotic. She also reported severe ear pain, a symptom she had not experienced since childhood. Her hearing was unaffected, but she felt congested. The treatment slightly alleviated her symptoms, but she still felt unwell. She returned to [facility] on 06/16/2025, where she was prescribed prednisone, Flonase, and another antibiotic. This treatment improved her condition, but she continued to feel as if she were underwater. She also reported hoarseness and difficulty singing, shouting, and maintaining balance. She consulted Dr. Trejo, who prescribed an inhaler and heartburn medication, which further improved her condition. However, she recently began experiencing ear pain again, prompting her to take her last remaining antibiotic pill on Sunday. She does not use Flonase daily and takes Zyrtec for allergies. She does not drink water and eats constantly, often waking up at night to eat ice cream or cookies. She has not slept well for months.    She occasionally takes  medication for reflux and does not believe she has reflux symptoms. She has a history of hiatal hernia and has undergone colonoscopy and upper endoscopy. She avoids colonoscopy due to adverse reactions to anesthesia, including severe migraines. She reports difficulty swallowing and a choking sensation.    PAST SURGICAL HISTORY:  Colonoscopy and upper endoscopy: 4-5 years ago    Results  Imaging   - Nasal endoscopy: Deviated septum, signs of seasonal allergies, high reflux, and evidence of chronic inflammation from reflux. No tumors, masses, or lesions observed.    Diagnostic Testing   - Audiological exam: 07/15/2025, Normal hearing bilaterally with type A tympanograms bilaterally.    Past Medical History:   Diagnosis Date    Allergic     Anxiety 2022    i have anxiety    Hiatal hernia     Migraines     PONV (postoperative nausea and vomiting)        Past Surgical History:   Procedure Laterality Date     SECTION      ESOPHAGEAL DILATATION         Family History: Her family history includes Breast cancer in her maternal aunt and mother; Cancer in her father and mother; Colon cancer in her maternal uncle; Coronary artery disease in her maternal aunt, maternal aunt, and maternal uncle; Prostate cancer in her maternal uncle and maternal uncle.     Social History: She  reports that she has never smoked. She has never used smokeless tobacco. She reports that she does not drink alcohol and does not use drugs.    Home Medications:  Elderberry, acetaminophen, albuterol sulfate HFA, cetirizine, chlorpheniramine, clindamycin, estradiol, guaiFENesin, mometasone, omeprazole, predniSONE, and ubrogepant    Allergies:  She is allergic to iodine, penicillins, shellfish-derived products, tetracaine hcl, tetracyclines & related, and sumatriptan.       Vital Signs:   Heart Rate:  [80] 80  Resp:  [19] 19  BP: (116)/(73) 116/73  ENT Physical Exam  Constitutional  Appearance: patient appears well-developed, well-nourished and  well-groomed,  Communication/Voice: communication appropriate for developmental age; vocal quality normal;  Head and Face  Appearance: head appears normal, face appears normal and face appears atraumatic;  Palpation: facial palpation normal;  Salivary: glands normal;  Ear  Hearing: intact;  Auricles: right auricle normal; left auricle normal;  External Mastoids: right external mastoid normal; left external mastoid normal;  Ear Canals: right ear canal normal; left ear canal normal;  Tympanic Membranes: right tympanic membrane normal; left tympanic membrane normal;  Nose  Internal Nose: bilateral intranasal mucosa edematous; nasal septal deviation present; bilateral inferior turbinates with hypertrophy;  Oral Cavity/Oropharynx  Lips: normal;  Teeth: normal;  Gums: gingiva normal;  Tongue: normal;  Oral mucosa: mucous membranes dry;  Hard palate: normal;  Neck  Neck: neck normal;  Respiratory  Inspection: breathing unlabored; normal breathing rate;  Cardiovascular  Inspection: extremities are warm and well perfused;  Lymphatic  Palpation: lymph nodes normal;       Physical Exam  Ears: Normal hearing bilaterally with type A tympanograms bilaterally. No evidence of infection or fluid behind the eardrums. Eardrums are moving normally.  Nose: Deviated septum. Signs of seasonal allergies. Evidence of chronic inflammation from reflux.  Specialty Assessment: Nasopharyngoscopy revealed deviated septum, signs of seasonal allergies, chronic inflammation from reflux, and normal voice box with no tumors, masses, or lesions.    Flexible laryngoscopy    Date/Time: 7/15/2025 10:19 AM    Performed by: Rebecca Biswas APRN  Authorized by: Rebecca Biswas APRN    Procedure details:     Indications: hoarseness, dysphagia, or aspiration      Medication:  Gaudencio-Synephrine 1%    Instrument: flexible fiberoptic laryngoscope      Scope location: bilateral nare    Nasal cavity:     Nasal vestibule: right nasal cavity occluded       Right inferior turbinates: edema      Left inferior turbinates: edema    Septum:     Deviation: deviated to the right      Spurs: right    Sinus/ Nasopharynx:     Right middle meatus: normal      Left middle meatus: normal      Right nasopharynx: inflammation and adenoid hypertrophy      Left nasopharynx: inflammation and adenoid hypertrophy      Left eustachian tube: inflammation and inflammation    Oropharynx/ Supraglottis:     Posterior pharyngeal wall: inflamed      Oropharynx: inflammation and retained secretions      Vallecula: inflammation      Base of tongue: lingual tonsillar hypertrophy and inflammation      Epiglottis: inflammation    Larynx/ Hypopharynx:     Arytenoids: inflammation      Hypopharynx: inflammation      Pyriform sinus: pooling of secretions      False vocal cords: normal      True vocal cords: normal    Post-procedure details:     Patient tolerance of procedure:  Tolerated well     Result Review       RESULTS REVIEW    I have reviewed the patients old records in the chart.   The following results/records were reviewed:   Procedure visit with Sammie Liu Au.D (07/15/2025) hearing wnl, type A bilaterally  Office Visit with Tk Trejo DO (06/30/2025)        Assessment & Plan  Reflux laryngitis    Hearing within normal limits in both ears    Dysfunction of both eustachian tubes       Assessment & Plan  1. Otitis media.  Her symptoms are likely due to silent reflux, which can cause inflammation in the eustachian tubes. This is exacerbated by her history of hiatal hernia and poor eating habits. The absence of any tumors, masses, or lesions suggests that her symptoms are not indicative of cancer. She was advised to consume between 60 and 80 ounces of water daily. A prescription for Mucinex was provided, to be taken twice daily with a full 8-ounce glass of water each time. Omeprazole was prescribed to be taken 30 minutes before dinner. She was also advised to avoid large snacks or  fatty foods after supper and to remain awake for 3 to 4 hours before bedtime. Elevating her head during sleep was recommended. A prescription for Nasacort was also provided. If there is no significant improvement in her symptoms within 6 weeks, a referral to a gastroenterologist will be considered for further evaluation and management of her chronic reflux.    2. Gastroesophageal reflux disease (GERD).  She has evidence of chronic inflammation from reflux. She was advised to consume between 60 and 80 ounces of water daily. Omeprazole was prescribed to be taken 30 minutes before dinner. She was also advised to avoid large snacks or fatty foods after supper and to remain awake for 3 to 4 hours before bedtime. Elevating her head during sleep was recommended. If there is no significant improvement in her symptoms within 6 weeks, a referral to a gastroenterologist will be considered for further evaluation and management of her chronic reflux.    PROCEDURE  Procedure: Nasal endoscopy    All questions were answered and agreement to proceed was given after the following Pre-Procedure details were reviewed:  - Consent: Verbal consent obtained    Intra-Procedure:  - Site Preparation: Numbing medicine administered via nasal spray  - Anesthesia: Numbing medicine via nasal spray  - Medication: Numbing medicine via nasal spray    Post-Procedure:  - Tolerance Level: Tolerated well  - Complications: None     Orders Placed This Encounter   Procedures    $ Laryngoscopy      New Medications Ordered This Visit   Medications    guaiFENesin (MUCINEX) 600 MG 12 hr tablet     Sig: Take 1 tablet by mouth Every 12 (Twelve) Hours.     Dispense:  60 tablet     Refill:  3    mometasone (NASONEX) 50 MCG/ACT nasal spray     Sig: Administer 2 sprays into the nostril(s) as directed by provider 2 (Two) Times a Day.     Dispense:  17 g     Refill:  5       Return in about 6 weeks (around 8/26/2025) for Follow up with SEAN Carbone.         Electronically signed by SEAN Carbone 07/15/25 10:20 AM CDT.     Patient or patient representative verbalized consent for the use of Ambient Listening during the visit with  SEAN Carbone for chart documentation. 7/15/2025  10:20 CDT

## 2025-07-15 NOTE — PROGRESS NOTES
AUDIOMETRIC EVALUATION      Name:  Michelle Schulz  :  1965  Age:  59 y.o.  Date of Evaluation:  07/15/2025       History:  Ms. Schulz is seen today for a hearing evaluation due to aural pain of the right ear at the request of SEAN Carbone.    Audiologic Information:  Concerns for Hearing: no  PETs: no  Other otologic surgical history: no  Aural Pressure/Fullness: Right   Otalgia: Right   Otorrhea: no  Tinnitus: no  Dizziness: At ear infections worst, felt off balance (no spinning)   Noise Exposure: no  Family history of hearing loss: no  Head trauma requiring hospital stay: no  Chemotherapy: no  Other significant history: no    **Case history obtained in office and through EMR system      EVALUATION:        RESULTS:    Otoscopic Evaluation:  Right: clear canal, tympanic membrane visualized  Left: clear canal, tympanic membrane visualized    Tympanometry (226 Hz):  Right: Type A  Left: Type A    Pure Tone Audiometry:    Bilateral: hearing within normal limits     Speech Audiometry:   Right: Speech Reception Threshold (SRT) was obtained at 10 dB HL  Word Recognition scores - Excellent (100)% at 50 dB, using NU-6 List 1A with 10 words  Left: Speech Reception Threshold (SRT) was obtained at 15 dB HL  Word Recognition scores - Excellent (100)% at 55 dB, using NU-6 List 2A with 10 words  SRT/PTA in good agreement bilaterally.    IMPRESSIONS:    For the right ear tympanometry showed normal middle ear pressure and static compliance, consistent with normal middle ear function.    For the left ear tympanometry showed normal middle ear pressure and static compliance, consistent with normal middle ear function.     Pure tone thresholds for both ears show hearing within normal limits, suggesting normal outer/middle ear function and normal cochlear/retrocochlear function.     Patient was counseled with regard to the findings.    Diagnosis:  1. Hearing within normal limits in both ears          RECOMMENDATIONS/PLAN:  Follow-up recommendations per SEAN Carbone.  Practice good communication strategies to assist with everyday listening (eye contact with speakers, reduce background noise, encourage others to communicate clearly and slowly).  Repeat hearing evaluation if changes in hearing are noted or concerns arise.  Discussed results and recommendations with patient. Questions were addressed and the patient was encouraged to contact our department should concerns arise.        Sammie Lowry, CCC-A  Doctor of Audiology

## 2025-07-29 ENCOUNTER — TELEPHONE (OUTPATIENT)
Dept: FAMILY MEDICINE CLINIC | Facility: CLINIC | Age: 60
End: 2025-07-29
Payer: COMMERCIAL

## 2025-07-29 NOTE — TELEPHONE ENCOUNTER
"    Caller: Michelle Schulz \"Michelle Schulz\"    Relationship: Self    Best call back number: [phone]    What is the best time to reach you: SOON PLEASE     Who are you requesting to speak with (clinical staff, provider,  specific staff member): CLINICAL STAFF         What was the call regarding: PATIENT REQUESTING A CALL BACK TO DISCUSS WHAT SHE CAN TAKE FOR SEVERE ALLERGY ISSUES AND SINUS DRAINAGE      Is it okay if the provider responds through MyChart: PREFERS A CALL BACK       "

## 2025-07-31 ENCOUNTER — OFFICE VISIT (OUTPATIENT)
Dept: FAMILY MEDICINE CLINIC | Facility: CLINIC | Age: 60
End: 2025-07-31
Payer: COMMERCIAL

## 2025-07-31 VITALS
OXYGEN SATURATION: 97 % | SYSTOLIC BLOOD PRESSURE: 106 MMHG | DIASTOLIC BLOOD PRESSURE: 64 MMHG | WEIGHT: 153 LBS | BODY MASS INDEX: 24.01 KG/M2 | HEART RATE: 99 BPM | HEIGHT: 67 IN | TEMPERATURE: 98.9 F

## 2025-07-31 DIAGNOSIS — R05.1 ACUTE COUGH: ICD-10-CM

## 2025-07-31 DIAGNOSIS — J01.10 ACUTE NON-RECURRENT FRONTAL SINUSITIS: Primary | ICD-10-CM

## 2025-07-31 DIAGNOSIS — J30.89 ENVIRONMENTAL AND SEASONAL ALLERGIES: ICD-10-CM

## 2025-07-31 RX ORDER — CEFDINIR 300 MG/1
300 CAPSULE ORAL 2 TIMES DAILY
Qty: 14 CAPSULE | Refills: 0 | Status: SHIPPED | OUTPATIENT
Start: 2025-07-31 | End: 2025-08-01 | Stop reason: ALTCHOICE

## 2025-07-31 RX ORDER — BENZONATATE 100 MG/1
100 CAPSULE ORAL EVERY 8 HOURS PRN
Qty: 30 CAPSULE | Refills: 0 | Status: SHIPPED | OUTPATIENT
Start: 2025-07-31 | End: 2025-08-01 | Stop reason: ALTCHOICE

## 2025-07-31 RX ORDER — METHYLPREDNISOLONE ACETATE 40 MG/ML
40 INJECTION, SUSPENSION INTRA-ARTICULAR; INTRALESIONAL; INTRAMUSCULAR; SOFT TISSUE ONCE
Status: COMPLETED | OUTPATIENT
Start: 2025-07-31 | End: 2025-07-31

## 2025-07-31 RX ADMIN — METHYLPREDNISOLONE ACETATE 40 MG: 40 INJECTION, SUSPENSION INTRA-ARTICULAR; INTRALESIONAL; INTRAMUSCULAR; SOFT TISSUE at 11:35

## 2025-07-31 NOTE — PROGRESS NOTES
SEAN Azevedo  [facility]   Family Medicine  [address]  [place] [address]  Phone: [phone]  Fax: [phone]         Chief Complaint:  Chief Complaint   Patient presents with    Cough        History:  Michelle Schulz is a 59 y.o. female that is an established patient. She  is here for evaluation of the above complaint.    Cough  Associated symptoms: headaches, postnasal drip, rhinorrhea, shortness of breath and wheezing    Associated symptoms: no chest pain, no chills, no ear pain, no fever, no myalgias, no rash and no sore throat    PMH includes: environmental allergies       History of Present Illness  The patient presents for evaluation of sinus infection.    She recently returned from a camping trip in [place], during which she experienced exposure to pollen. Upon her return, she noticed nasal drainage and developed a cough. She also reports feeling pressure in her sinuses and fatigue but has not had any fever or sore throat. She is seeking medication to alleviate her symptoms. She reports not drinking enough water and prefers flavored water as plain water makes her gag. She has been using Mucinex and Zyrtec without significant relief. She has also tried cough medicine to aid sleep and Flonase nasal spray. She typically receives a steroid injection for similar symptoms, which usually provides relief.     She recently recovered from a double ear infection, which required consultation with five different doctors. She sought care at [facility] twice before coming here due to persistent ear issues, including constant echoing. A chiropractor was able to resolve this issue. She experiences shortness of breath when coughing excessively, which also causes rib pain.    Diet: Prefers flavored water over plain water      Results         ROS:  Review of Systems   Constitutional:  Negative for chills and fever.   HENT:  Positive for congestion, postnasal drip, rhinorrhea and sinus  "pain. Negative for ear pain and sore throat.    Respiratory:  Positive for cough, shortness of breath and wheezing.    Cardiovascular:  Negative for chest pain.   Musculoskeletal:  Negative for myalgias.   Skin:  Negative for rash.   Allergic/Immunologic: Positive for environmental allergies.   Neurological:  Positive for headaches.         reports that she has never smoked. She has never used smokeless tobacco. She reports that she does not drink alcohol and does not use drugs.    Current Outpatient Medications   Medication Instructions    benzonatate (TESSALON PERLES) 100 mg, Oral, Every 8 Hours PRN    cefdinir (OMNICEF) 300 mg, Oral, 2 Times Daily    cetirizine (ZYRTEC) 5 mg, Daily    chlorpheniramine (CHLOR-TRIMETON) 4 mg, Every 6 Hours PRN    ELDERBERRY PO Take  by mouth.    guaiFENesin (MUCINEX) 600 mg, Oral, Every 12 Hours Scheduled    mometasone (NASONEX) 50 MCG/ACT nasal spray 2 sprays, Nasal, 2 Times Daily       OBJECTIVE:  /64   Pulse 99   Temp 98.9 °F (37.2 °C)   Ht 170.2 cm (67.01\")   Wt 69.4 kg (153 lb)   SpO2 97%   BMI 23.96 kg/m²    Physical Exam  Vitals and nursing note reviewed.   Constitutional:       Appearance: Normal appearance.   HENT:      Head: Normocephalic and atraumatic.      Right Ear: Tympanic membrane, ear canal and external ear normal. There is no impacted cerumen.      Left Ear: Tympanic membrane, ear canal and external ear normal. There is no impacted cerumen.      Nose: Congestion and rhinorrhea present.      Right Turbinates: Enlarged.      Left Turbinates: Enlarged.      Right Sinus: Frontal sinus tenderness present.      Left Sinus: Frontal sinus tenderness present.      Mouth/Throat:      Pharynx: Oropharynx is clear. No oropharyngeal exudate or posterior oropharyngeal erythema.   Eyes:      Conjunctiva/sclera: Conjunctivae normal.   Cardiovascular:      Rate and Rhythm: Normal rate and regular rhythm.   Pulmonary:      Effort: Pulmonary effort is normal. No " respiratory distress.      Breath sounds: Normal breath sounds. No wheezing or rales.   Neurological:      General: No focal deficit present.      Mental Status: She is alert and oriented to person, place, and time.   Psychiatric:         Mood and Affect: Mood normal.         Behavior: Behavior normal.       Physical Exam  Respiratory: Clear to auscultation, no wheezing, rales or rhonchi    Procedures    Assessment/Plan:     Diagnoses and all orders for this visit:    1. Acute non-recurrent frontal sinusitis (Primary)  -     methylPREDNISolone acetate (DEPO-medrol) injection 40 mg  -     cefdinir (OMNICEF) 300 MG capsule; Take 1 capsule by mouth 2 (Two) Times a Day for 7 days.  Dispense: 14 capsule; Refill: 0    2. Acute cough  -     benzonatate (Tessalon Perles) 100 MG capsule; Take 1 capsule by mouth Every 8 (Eight) Hours As Needed for Cough.  Dispense: 30 capsule; Refill: 0    3. Environmental and seasonal allergies      BMI is within normal parameters. No other follow-up for BMI required.     Assessment & Plan  1. Sinus infection.  - Symptoms include nasal drainage, cough, and facial pressure, likely due to recent exposure to environmental allergens such as pollen.  - Physical examination reveals pressure in the facial area and no fever or sore throat. Flonase and Zyrtec have been used but are not providing sufficient relief.  - Discussed the need for a steroid injection and oral antibiotics. Reviewed the patient's allergy to penicillin and tetracycline, and previous tolerance to clindamycin.  - Prescribed Cefdinir 300 mg to be taken twice daily for a week. Advised to continue using Flonase, 2 sprays in each nostril once daily, and maintain Zyrtec regimen. Recommended increasing water intake to help thin out mucus. Medication sent to pharmacy.    An After Visit Summary was printed and given to the patient at discharge.  Return for Next scheduled follow up.       Patient Instructions   Continue Zyrtec  Continue  Flonase  Cefdinir twice a day for 7 days      Discussion:     I spent 28 minutes caring for Michelle on this date of service. This time includes time spent by me in the following activities: preparing for the visit, reviewing tests, performing a medically appropriate examination and/or evaluation, counseling and educating the patient/family/caregiver, documenting information in the medical record, independently interpreting results and communicating that information with the patient/family/caregiver, care coordination, ordering medications, obtaining a separately obtained history, and reviewing a separately obtained history   Patient or patient representative verbalized consent for the use of Ambient Listening during the visit with  SEAN Azevedo for chart documentation. 7/31/2025  11:50 CDT    Tamiko ESTRADA 7/31/2025   Electronically signed.

## 2025-08-01 ENCOUNTER — TELEPHONE (OUTPATIENT)
Dept: FAMILY MEDICINE CLINIC | Facility: CLINIC | Age: 60
End: 2025-08-01
Payer: COMMERCIAL

## 2025-08-01 ENCOUNTER — APPOINTMENT (OUTPATIENT)
Dept: GENERAL RADIOLOGY | Facility: HOSPITAL | Age: 60
End: 2025-08-01
Payer: COMMERCIAL

## 2025-08-01 PROCEDURE — 71046 X-RAY EXAM CHEST 2 VIEWS: CPT

## 2025-08-01 NOTE — TELEPHONE ENCOUNTER
PT SEEN [place] YESTERDAY AND IS NOT FEELING ANY BETTER. SHE WOULD LIKE A CALL BACK AND SPEAK TO CLINICAL. PLEASE ADVISE AND CALL BACK.

## 2025-08-01 NOTE — TELEPHONE ENCOUNTER
Spoke to patient, she feels worse, cough is constant, head has pressure from back of her head to the front. I recommended that she go to the Immediate care as we have no openings today. Patient agreed.